# Patient Record
Sex: FEMALE | Race: WHITE | Employment: OTHER | URBAN - METROPOLITAN AREA
[De-identification: names, ages, dates, MRNs, and addresses within clinical notes are randomized per-mention and may not be internally consistent; named-entity substitution may affect disease eponyms.]

---

## 2019-04-10 ENCOUNTER — OFFICE VISIT (OUTPATIENT)
Dept: INTERNAL MEDICINE CLINIC | Age: 84
End: 2019-04-10
Payer: COMMERCIAL

## 2019-04-10 VITALS
HEIGHT: 56 IN | HEART RATE: 81 BPM | SYSTOLIC BLOOD PRESSURE: 112 MMHG | OXYGEN SATURATION: 97 % | BODY MASS INDEX: 22.36 KG/M2 | RESPIRATION RATE: 16 BRPM | DIASTOLIC BLOOD PRESSURE: 60 MMHG | WEIGHT: 99.4 LBS

## 2019-04-10 DIAGNOSIS — M41.20 SCOLIOSIS (AND KYPHOSCOLIOSIS), IDIOPATHIC: ICD-10-CM

## 2019-04-10 DIAGNOSIS — D63.8 ANEMIA, CHRONIC DISEASE: ICD-10-CM

## 2019-04-10 DIAGNOSIS — M81.0 OSTEOPOROSIS, UNSPECIFIED OSTEOPOROSIS TYPE, UNSPECIFIED PATHOLOGICAL FRACTURE PRESENCE: ICD-10-CM

## 2019-04-10 DIAGNOSIS — E53.8 VITAMIN B12 DEFICIENCY: ICD-10-CM

## 2019-04-10 DIAGNOSIS — G56.02 LEFT CARPAL TUNNEL SYNDROME: Primary | ICD-10-CM

## 2019-04-10 DIAGNOSIS — F17.200 SMOKER: ICD-10-CM

## 2019-04-10 DIAGNOSIS — R53.83 FATIGUE, UNSPECIFIED TYPE: ICD-10-CM

## 2019-04-10 PROCEDURE — 4040F PNEUMOC VAC/ADMIN/RCVD: CPT | Performed by: INTERNAL MEDICINE

## 2019-04-10 PROCEDURE — 99204 OFFICE O/P NEW MOD 45 MIN: CPT | Performed by: INTERNAL MEDICINE

## 2019-04-10 PROCEDURE — G8420 CALC BMI NORM PARAMETERS: HCPCS | Performed by: INTERNAL MEDICINE

## 2019-04-10 PROCEDURE — 1090F PRES/ABSN URINE INCON ASSESS: CPT | Performed by: INTERNAL MEDICINE

## 2019-04-10 PROCEDURE — 1123F ACP DISCUSS/DSCN MKR DOCD: CPT | Performed by: INTERNAL MEDICINE

## 2019-04-10 PROCEDURE — 4004F PT TOBACCO SCREEN RCVD TLK: CPT | Performed by: INTERNAL MEDICINE

## 2019-04-10 PROCEDURE — G8427 DOCREV CUR MEDS BY ELIG CLIN: HCPCS | Performed by: INTERNAL MEDICINE

## 2019-04-10 RX ORDER — GLIMEPIRIDE 2 MG/1
1 TABLET ORAL 2 TIMES DAILY
COMMUNITY
End: 2019-05-13 | Stop reason: ALTCHOICE

## 2019-04-10 RX ORDER — ANTIOX #8/OM3/DHA/EPA/LUT/ZEAX 250-2.5 MG
1 CAPSULE ORAL 2 TIMES DAILY
COMMUNITY

## 2019-04-10 RX ORDER — TRIAMCINOLONE ACETONIDE 1 MG/G
CREAM TOPICAL 2 TIMES DAILY
COMMUNITY

## 2019-04-10 RX ORDER — LEVOCETIRIZINE DIHYDROCHLORIDE 5 MG/1
5 TABLET, FILM COATED ORAL NIGHTLY
COMMUNITY

## 2019-04-10 RX ORDER — UREA 200 MG/G
GEL TOPICAL PRN
COMMUNITY
End: 2019-04-10 | Stop reason: ALTCHOICE

## 2019-04-10 SDOH — HEALTH STABILITY: MENTAL HEALTH: HOW OFTEN DO YOU HAVE A DRINK CONTAINING ALCOHOL?: NEVER

## 2019-04-10 ASSESSMENT — PATIENT HEALTH QUESTIONNAIRE - PHQ9
SUM OF ALL RESPONSES TO PHQ QUESTIONS 1-9: 0
SUM OF ALL RESPONSES TO PHQ9 QUESTIONS 1 & 2: 0
1. LITTLE INTEREST OR PLEASURE IN DOING THINGS: 0
SUM OF ALL RESPONSES TO PHQ QUESTIONS 1-9: 0
2. FEELING DOWN, DEPRESSED OR HOPELESS: 0

## 2019-04-10 ASSESSMENT — ENCOUNTER SYMPTOMS
COUGH: 0
ABDOMINAL PAIN: 0
BACK PAIN: 1
TROUBLE SWALLOWING: 0
NAUSEA: 0
EYE DISCHARGE: 0
VOICE CHANGE: 0
EYE ITCHING: 0
VOMITING: 0
SHORTNESS OF BREATH: 0
COLOR CHANGE: 0
BLOOD IN STOOL: 0
DIARRHEA: 0
WHEEZING: 0
CHEST TIGHTNESS: 0

## 2019-04-10 NOTE — PROGRESS NOTES
Subjective:      Cady Ribeiro is a 80 y.o. female who presents today as a new patient    Patient Active Problem List   Diagnosis    Smoker    Scoliosis (and kyphoscoliosis), idiopathic    Osteoporosis        She is former patient of Dr Jessica Giles who retired  She had been seeing him many yrs    She is ambulatory with a walker    She had fall 2 yr ago and has residual problem with left hand and arm; Had some left cerv radicular sx and now pain is limited to left hand ; assoc with tingling; improved if hand is propped up ; no testing done; no EMG  ? CTS    She uses kenalog on back from Dr Lucian Watson for eczema    She has kyphosis, scolisosis, and likely osteoporosis ;she stopped her bisphosphonate a few yrs ago and is untreated now  She didn't like fosamax    She lives alone in apt  She has lifeline    She smokes ; counseled ; not even slightly interested in quitting;  Smokes 1/3 PPD    The patient denies cough, chest pain, dyspnea, wheezing or hemoptysis. Patient denies any exertional chest pain, dyspnea, palpitations, syncope, orthopnea, edema or paroxysmal nocturnal dyspnea. Mood and memory satisfactory    Current Outpatient Medications   Medication Sig Dispense Refill    levocetirizine (XYZAL) 5 MG tablet Take 5 mg by mouth nightly      triamcinolone (KENALOG) 0.1 % cream Apply topically 2 times daily Apply topically 2 times daily.  Multiple Vitamins-Minerals (PRESERVISION AREDS 2) CAPS Take 1 tablet by mouth 2 times daily       timolol (TIMOPTIC) 0.25 % ophthalmic solution 1 drop 2 times daily      bimatoprost (LUMIGAN) 0.01 % SOLN ophthalmic drops Place 1 drop into both eyes nightly      brimonidine (ALPHAGAN P) 0.1 % SOLN 1 drop every 8 hours       No current facility-administered medications for this visit.         Past Medical History:   Diagnosis Date    Eczema     Dr Mago Irvin Glaucoma     Dr Iram Brenner Osteoporosis     Scoliosis (and kyphoscoliosis), idiopathic     Smoker       Past Surgical History:   Procedure Laterality Date    CATARACT REMOVAL WITH IMPLANT Bilateral 2004       Social History     Tobacco Use    Smoking status: Current Every Day Smoker     Packs/day: 0.30     Years: 85.00     Pack years: 25.50     Types: Cigarettes    Smokeless tobacco: Never Used   Substance Use Topics    Alcohol use: Never     Frequency: Never      Review of Systems   Constitutional: Positive for fatigue. Negative for activity change, appetite change, fever and unexpected weight change. HENT: Negative for congestion, dental problem, postnasal drip, trouble swallowing and voice change. Eyes: Negative for discharge and itching. Respiratory: Negative for cough, chest tightness, shortness of breath and wheezing. Cardiovascular: Negative for chest pain, palpitations and leg swelling. Gastrointestinal: Negative for abdominal pain, blood in stool, diarrhea, nausea and vomiting. Genitourinary: Negative for difficulty urinating, dysuria, hematuria and vaginal bleeding. Has no incontinence   Musculoskeletal: Positive for arthralgias, back pain and neck stiffness. Negative for gait problem, joint swelling and myalgias. Skin: Negative for color change, pallor and rash. Neurological: Positive for numbness. Negative for dizziness, tremors and speech difficulty. Hematological: Negative for adenopathy. Does not bruise/bleed easily. Psychiatric/Behavioral: Negative for behavioral problems, decreased concentration, dysphoric mood and suicidal ideas. The patient is not nervous/anxious. Objective:      /60 (Site: Right Upper Arm, Position: Sitting, Cuff Size: Medium Adult)   Pulse 81   Resp 16   Ht 4' 8\" (1.422 m)   Wt 99 lb 6.4 oz (45.1 kg)   SpO2 97%   BMI 22.29 kg/m²      Physical Exam   Constitutional: She is oriented to person, place, and time. She appears well-nourished. No distress.    Elderly frail lady of small stature with obvious gibbus deformity and scoliosis ambulatory slowly with walker   HENT:   Head: Normocephalic and atraumatic. Mouth/Throat: Oropharynx is clear and moist. No oropharyngeal exudate. Eyes: Conjunctivae are normal. No scleral icterus. Neck:   Limited neck ROM; head forward and canted to left from scoliosis and kypohosis   Cardiovascular: Normal rate and regular rhythm. No murmur heard. Pulmonary/Chest: Effort normal. No respiratory distress. She has no wheezes. She has no rales. Abdominal: Soft. She exhibits no distension. There is no tenderness. Musculoskeletal: Normal range of motion. She exhibits no edema or deformity. Neurological: She is alert and oriented to person, place, and time. Coordination abnormal.   Skin: Skin is warm and dry. Psychiatric: She has a normal mood and affect. Her behavior is normal.   Vitals reviewed. No old records available     Assessment / Plan:      1. Left carpal tunnel syndrome    2. Osteoporosis, unspecified osteoporosis type, unspecified pathological fracture presence    3. Scoliosis (and kyphoscoliosis), idiopathic    4. Smoker    5. Vitamin B12 deficiency    6. Anemia, chronic disease    7.  Fatigue, unspecified type            Plan   Get left wrist spling  Get EMG left hand, arm , neck  Get labs   Orders Placed This Encounter   Procedures    CBC Auto Differential    Comprehensive Metabolic Panel    TSH without Reflex    T4, Free    Vitamin B12    EMG     Same meds  RTC 2 wk after EMG    Consider DEXA and prolia ; but she isn't interested as she is 80 ; she feels little ability to change  Won't consider smoking cessation ; discussed risk of fire also

## 2019-04-18 ENCOUNTER — OFFICE VISIT (OUTPATIENT)
Dept: PHYSICAL MEDICINE AND REHAB | Age: 84
End: 2019-04-18
Payer: COMMERCIAL

## 2019-04-18 DIAGNOSIS — G56.03 BILATERAL CARPAL TUNNEL SYNDROME: ICD-10-CM

## 2019-04-18 DIAGNOSIS — M79.602 PARESTHESIA AND PAIN OF BOTH UPPER EXTREMITIES: ICD-10-CM

## 2019-04-18 DIAGNOSIS — M54.12 CERVICAL RADICULOPATHY AT C8: Primary | ICD-10-CM

## 2019-04-18 DIAGNOSIS — M79.601 PARESTHESIA AND PAIN OF BOTH UPPER EXTREMITIES: ICD-10-CM

## 2019-04-18 DIAGNOSIS — R20.2 PARESTHESIA AND PAIN OF BOTH UPPER EXTREMITIES: ICD-10-CM

## 2019-04-18 DIAGNOSIS — G56.23 ULNAR NEUROPATHY OF BOTH UPPER EXTREMITIES: ICD-10-CM

## 2019-04-18 PROCEDURE — 99999 PR OFFICE/OUTPT VISIT,PROCEDURE ONLY: CPT | Performed by: PHYSICAL MEDICINE & REHABILITATION

## 2019-04-18 PROCEDURE — 95911 NRV CNDJ TEST 9-10 STUDIES: CPT | Performed by: PHYSICAL MEDICINE & REHABILITATION

## 2019-04-18 PROCEDURE — 95885 MUSC TST DONE W/NERV TST LIM: CPT | Performed by: PHYSICAL MEDICINE & REHABILITATION

## 2019-04-18 NOTE — LETTER
April 18, 2019        Ana Olguin MD  [facility], [address]        Patient Name: Сергей Kelly   MRN Number: [id number]   YOB: 1929   Date Of Visit: 04/18/2019          Dear Ana Olguin MD,       Thank you for referring Сергей Kelly to me for electrodiagnostic testing. Attached are the findings of the EMG and my assessment. If you have any further questions, please do not hesitate to call me. Thank you for this interesting referral.      Sincerely,          CECIL Hawkins MD.

## 2019-04-18 NOTE — PROGRESS NOTES
Insertional Activity Spontaneous  Activity Volutional  MUAP's   Cerv Parasp    Normal None Normal   Infraspinatus    Normal None Normal   Deltoid      Normal None Normal   Biceps      Normal None Normal   Triceps      Normal None Dec #   Pronator Teres    Normal None Normal   Extensor Indicis    Normal None Dec #, Larger   1st Dorsal Interosseus    Normal None Dec #, Larger   ADM    Normal None Dec #. Larger   Abductor Pollicis Br. Normal None Dec #, Larger       FINDINGS:   EMG of the cervical paraspinals and left upper limb revealed paraspinal muscle guarding with needle exam of that region. No limb muscle membrane irritability was identified. Distinctly reduced number of much larger motor units were seen throughout the left C8 myotome. Median sensory latencies were mildly delayed across each wrist. Median motor conductions were normal. The left ulnar sensory latency was delayed and the SNAP amplitude was reduced. Bilaterally ulnar motor conductions were compromised around each elbow. IMPRESSION:      1. Abnormal EMG. There is evidence of a mature/remote left C8 spinal nerve root injury of significance severity (moderately severe left C8 radiculopathy, remote). I do believe this explains her clinical presentation. Correlation with cervical spine imaging is encouraged. 2. 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 3. Minor median neuropathies at the wrists (very mild bilateral carpal tunnel syndrome). 4. No evidence of a cervical plexopathy or primary muscle disease.        Thank you for this interesting referral.

## 2019-05-13 ENCOUNTER — OFFICE VISIT (OUTPATIENT)
Dept: INTERNAL MEDICINE CLINIC | Age: 84
End: 2019-05-13
Payer: COMMERCIAL

## 2019-05-13 VITALS
OXYGEN SATURATION: 96 % | SYSTOLIC BLOOD PRESSURE: 120 MMHG | WEIGHT: 100.4 LBS | DIASTOLIC BLOOD PRESSURE: 68 MMHG | HEART RATE: 76 BPM | RESPIRATION RATE: 12 BRPM | BODY MASS INDEX: 22.51 KG/M2

## 2019-05-13 DIAGNOSIS — G89.29 CHRONIC LEFT-SIDED LOW BACK PAIN WITH LEFT-SIDED SCIATICA: Primary | ICD-10-CM

## 2019-05-13 DIAGNOSIS — F17.200 SMOKER: ICD-10-CM

## 2019-05-13 DIAGNOSIS — M81.0 OSTEOPOROSIS, UNSPECIFIED OSTEOPOROSIS TYPE, UNSPECIFIED PATHOLOGICAL FRACTURE PRESENCE: ICD-10-CM

## 2019-05-13 DIAGNOSIS — M41.20 SCOLIOSIS (AND KYPHOSCOLIOSIS), IDIOPATHIC: ICD-10-CM

## 2019-05-13 DIAGNOSIS — E03.8 SUBCLINICAL HYPOTHYROIDISM: ICD-10-CM

## 2019-05-13 DIAGNOSIS — M54.42 CHRONIC LEFT-SIDED LOW BACK PAIN WITH LEFT-SIDED SCIATICA: Primary | ICD-10-CM

## 2019-05-13 PROCEDURE — 99214 OFFICE O/P EST MOD 30 MIN: CPT | Performed by: INTERNAL MEDICINE

## 2019-05-13 RX ORDER — DORZOLAMIDE HYDROCHLORIDE AND TIMOLOL MALEATE 20; 5 MG/ML; MG/ML
SOLUTION/ DROPS OPHTHALMIC
Refills: 1 | COMMUNITY
Start: 2019-04-03 | End: 2019-07-11 | Stop reason: ALTCHOICE

## 2019-05-13 RX ORDER — UREA 20 %
CREAM (GRAM) TOPICAL
Refills: 6 | COMMUNITY
Start: 2019-04-16 | End: 2019-05-13 | Stop reason: ALTCHOICE

## 2019-05-13 NOTE — PROGRESS NOTES
Subjective:      Familia Anna is a 80 y.o. female who presents today for follow up on her chronic medical conditions as noted below. Patient Active Problem List:     Smoker     Scoliosis (and kyphoscoliosis), idiopathic     Osteoporosis     She was last seen a month ago as new patient from [facility]    She complained of     She had fall 2 yr ago and has residual problem with left hand and arm; Had some left cerv radicular sx and now pain is limited to left hand ; assoc with tingling; improved if hand is propped up ; no testing done; no EMG  ? CTS     She has kyphosis, scolisosis, and likely osteoporosis ;she stopped her bisphosphonate a few yrs ago and is untreated now  She didn't like fosamax     She lives alone in apt  She has lifeline     She smokes ; counseled ; not even slightly interested in quitting;  Smokes 1/3 PPD       She had EMG for sx of left cerv rad in April     IMPRESSION:                  1. Abnormal EMG. There is evidence of a mature/remote left C8 spinal nerve root injury of significance severity (moderately severe left C8 radiculopathy, remote). I do believe this explains her clinical presentation. Correlation with cervical spine imaging is encouraged.                 2. 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                 3. Minor median neuropathies at the wrists (very mild bilateral carpal tunnel syndrome).                4. No evidence of a cervical plexopathy or primary muscle disease.     I rev'd EMG and suggested MRI and eval for epidurals / neurology eval  She declined ; doesn't bother her that much ; denies neck pain now and can't lie flat for 20 min for MRI    Has some left low back pain  Will send for PT    Has elevated TSH > 8  With normal Free T4   Subclinical hypothyroidism    However,  Has feeling of being cold and has some constipation    Patient denies any exertional chest pain, dyspnea, palpitations, syncope, orthopnea, edema or paroxysmal nocturnal dyspnea.     Mood and memory fine    Current Outpatient Medications   Medication Sig Dispense Refill    dorzolamide-timolol (COSOPT) 22.3-6.8 MG/ML ophthalmic solution INSTILL ONE DROP INTO BOTH EYES TWO TIMES A DAY  1    levocetirizine (XYZAL) 5 MG tablet Take 5 mg by mouth nightly      triamcinolone (KENALOG) 0.1 % cream Apply topically 2 times daily Apply topically 2 times daily.  Multiple Vitamins-Minerals (PRESERVISION AREDS 2) CAPS Take 1 tablet by mouth 2 times daily       bimatoprost (LUMIGAN) 0.01 % SOLN ophthalmic drops Place 1 drop into both eyes nightly      brimonidine (ALPHAGAN P) 0.1 % SOLN 1 drop every 8 hours       No current facility-administered medications for this visit. Past Medical History:   Diagnosis Date    Eczema     Dr Milo Naik Osteoporosis     Scoliosis (and kyphoscoliosis), idiopathic     Smoker         Social History     Tobacco Use    Smoking status: Current Every Day Smoker     Packs/day: 0.30     Years: 85.00     Pack years: 25.50     Types: Cigarettes    Smokeless tobacco: Never Used   Substance Use Topics    Alcohol use: Never     Frequency: Never        ROS: The patient has had no headache, sore throat, fever or chills, cough, dyspnea, chest pain, nausea, vomiting or diarrhea, or edema. Objective:      /68   Pulse 76   Resp 12   Wt 100 lb 6.4 oz (45.5 kg)   SpO2 96%   BMI 22.51 kg/m²      Physical Exam   Constitutional: She is oriented to person, place, and time. She appears well-developed and well-nourished. No distress. HENT:   Head: Normocephalic and atraumatic. Mouth/Throat: Oropharynx is clear and moist.   Eyes: Conjunctivae are normal. No scleral icterus. Neck: Neck supple. Cardiovascular: Normal rate and regular rhythm. No murmur heard. Pulmonary/Chest: Effort normal. No respiratory distress. She has no wheezes. She has no rales. Abdominal: Soft. She exhibits no distension. There is no tenderness. Musculoskeletal:   Sits with head canted to left   Neurological: She is alert and oriented to person, place, and time. Coordination abnormal.   Skin: Skin is warm and dry. Psychiatric: She has a normal mood and affect. Her behavior is normal.   Vitals reviewed. Lab rev'd     Assessment / Plan:      1. Chronic left-sided low back pain with left-sided sciatica    2.  Subclinical hypothyroidism            Plan   Refer to PT for low back pain  Declined MRI and eval for cerv epidural and neuro eval for ulnar neuro  Will repeat thyroid function  RTC 2 mo  Orders Placed This Encounter   Procedures    TSH without Reflex    T4, Free   [facility]     Counseled to stop cigs and tx for osteo  Will follow    Script written for ensure one can bid for nutritional support

## 2019-05-29 DIAGNOSIS — E03.8 SUBCLINICAL HYPOTHYROIDISM: Primary | ICD-10-CM

## 2019-05-29 DIAGNOSIS — E03.8 SUBCLINICAL HYPOTHYROIDISM: ICD-10-CM

## 2019-05-29 LAB
T4 FREE: 1.2 NG/DL (ref 0.9–1.8)
TSH SERPL DL<=0.05 MIU/L-ACNC: 5.84 UIU/ML (ref 0.27–4.2)

## 2019-06-03 ENCOUNTER — HOSPITAL ENCOUNTER (OUTPATIENT)
Dept: PHYSICAL THERAPY | Age: 84
Setting detail: THERAPIES SERIES
Discharge: HOME | End: 2019-06-03
Payer: COMMERCIAL

## 2019-06-03 PROCEDURE — 97162 PT EVAL MOD COMPLEX 30 MIN: CPT

## 2019-06-03 PROCEDURE — 97112 NEUROMUSCULAR REEDUCATION: CPT

## 2019-06-03 ASSESSMENT — PAIN DESCRIPTION - LOCATION: LOCATION: NECK

## 2019-06-03 ASSESSMENT — PAIN DESCRIPTION - PAIN TYPE: TYPE: CHRONIC PAIN

## 2019-06-03 ASSESSMENT — PAIN - FUNCTIONAL ASSESSMENT: PAIN_FUNCTIONAL_ASSESSMENT: PREVENTS OR INTERFERES SOME ACTIVE ACTIVITIES AND ADLS

## 2019-06-03 ASSESSMENT — PAIN DESCRIPTION - FREQUENCY: FREQUENCY: CONTINUOUS

## 2019-06-03 ASSESSMENT — PAIN SCALES - GENERAL: PAINLEVEL_OUTOF10: 4

## 2019-06-03 NOTE — PROGRESS NOTES
Physical Therapy  Initial Assessment  Date: 6/3/2019  Patient Name: Angela Rodriguez  MRN: [id number]  : 1929     Treatment Diagnosis: neck pain    Restrictions  Position Activity Restriction  Other position/activity restrictions: none    Subjective   General  Chart Reviewed: Yes  Patient assessed for rehabilitation services?: Yes  Additional Pertinent Hx: Abnormal EMG. There is evidence of a mature/remote left C8 spinal nerve root injury of significance severity (moderately severe left C8 radiculopathy, remote). Family / Caregiver Present: Yes  Referring Practitioner: Luis Correa  Diagnosis: L LBP with L side sciatica  Follows Commands: Within Functional Limits  PT Visit Information  PT Insurance Information: [employer] Britton covers  patient's co pay  Subjective  Subjective: ongoing neck/spinal pain since falling  about 2 aand 1/2 years ago  Pain Screening  Patient Currently in Pain: Yes  Pain Assessment  Pain Assessment: 0-10  Pain Level: 4  Pain Type: Chronic pain  Pain Location: Neck(R upper back)  Pain Frequency: Continuous  Functional Pain Assessment: Prevents or interferes some active activities and ADLs  Vital Signs  Patient Currently in Pain: Yes    Vision/Hearing  Vision  Vision: (glasses)  Hearing  Hearing: Within functional limits    Orientation  Orientation  Overall Orientation Status: Within Normal Limits    Social/Functional History  Social/Functional History  Lives With: Alone  Type of Home: Apartment  ADL Assistance: Independent  Homemaking Assistance: Needs assistance  Meal Prep: Total  Laundry: Total  Vacuuming: Total  Cleaning:  Total  Driving: Total    Objective     Observation/Palpation  Posture: Poor  Palpation: R > L upper trap TTP  Observation: - severe thoracic kyphosis; head SB L/ rot to R; scapula protracted    Spine  Cervical: R/L ROT AROM 20* limited by stiffness    Strength RUE  Comment: grossly 3/5  Strength ALYSIA  Comment: grossly 3/5                      Ambulation  Ambulation?: Yes  Ambulation 1  Device: Rolling Walker  Gait Deviations: Slow Ameena;Decreased head and trunk rotation                            Assessment   Conditions Requiring Skilled Therapeutic Intervention  Body structures, Functions, Activity limitations: Decreased ROM; Increased Pain;Decreased functional mobility   Treatment Diagnosis: neck pain  Prognosis: Good  Decision Making: Medium Complexity  History: PF- chronic  neck pain  Exam: neck ROM/posture  Clinical Presentation: changing charactersitics of functio due to pain  REQUIRES PT FOLLOW UP: Yes         Plan        G-Code       OutComes Score                                                  AM-PAC Score             Goals  Short term goals  Time Frame for Short term goals: check in 10 sessions  Short term goal 1: compliant with HEP  Short term goal 2: patient reports 10% improvement in condition since starting PT  Long term goals  Time Frame for Long term goals : 12 weeks 8/27/19  Long term goal 1: patient's goal- less neck pain/improve posture       Therapy Time   Individual Concurrent Group Co-treatment   Time In 1017         Time Out 1052         Minutes 35         Timed Code Treatment Minutes: 600 Calixto Tiwari, PT

## 2019-06-03 NOTE — PLAN OF CARE
Outpatient Physical Therapy        [x] Phone: [phone]   Fax: [phone]   Pediatric Therapy          [] Phone: [phone]   Fax: [phone]  [facility]          [] Phone: [phone]   Fax: [phone]      To: Referring Practitioner: Pillo Knapp    From: Fei Ware, WANDA     Patient: Fatmata Mars       : 1929  Diagnosis: L LBP with L side sciatica   Treatment Diagnosis: neck pain   Date: 6/3/2019    Physical Therapy Certification/Re-Certification Form    The following patient has been evaluated for physical therapy services and for therapy to continue, Please review the attached evaluation and/or summary of the patient's plan of care, and verify that you agree therapy should continue by signing the attached document and sending it back to our office.     Assessment:  Patient primary complaints:neck/upper back pain   History of condition:ongoing neck/spinal pain since falling  about 2 and 1/2 years ago  Current functional limitations: difficulty with neck movement/maniating proper neck posture  PLOF:[place] alone - has had help for some time- LOF has been stattc for @ least past year  Prominent clinical findings:neck AROM llimited R/L ROT- patient holds nead with SB to L/ROT to R  Progressive treatment plan will emphasize:AROM/ stretching/posture/ HEP  Barriers to learning:/preferred learning style:  none/ written- demonstration -practice  Potential barriers to progress:chronic pain changes  The patient appears/reports motivated to regain PLOF: yes   Planned Services:  [x] Therapeutic Exercise    [] Aquatics:  [x] Therapeutic Activity    [] Ultrasound  [] Elec Stimulation  [] Gait Training     [] Cervical Traction [] Lumbar Traction  [x] Neuromuscular Re-education [] Cold/hotpack [] Iontophoresis   [x] Instruction in HEP       [x] Manual Therapy     [] vasopneumatic            [x] Self care home management        []Dry needling trigger point point/pain management    Plan of Care Date Range: Till 8/27/19    ? Frequency/Duration:  # Days per week: [] 1 day # Weeks: [] 1 week [] 5 weeks     [x] 2 days? [] 2 weeks [] 6 weeks     [] 3 days   [] 3 weeks [] 7 weeks     [] 4 days   [] 4 weeks [x] 12 weeks  Rehab Potential: [] Excellent [x] Good [] Fair  [] Poor   Goals:  Short term goals  Time Frame for Short term goals: check in 10 sessions  Short term goal 1: compliant with HEP  Short term goal 2: patient reports 10% improvement in condition since starting PT  Long term goals  Time Frame for Long term goals : 12 weeks 8/27/19  Long term goal 1: patient's goal- less neck pain/improve posture  Electronically signed by:  Prakash Almeida PT, 6/3/2019, 5:34 PM        If you have any questions or concerns, please don't hesitate to call.   Thank you for your referral.    Physician Signature:_________________Date:____________Time: ________  By signing above, therapists plan is approved by physician

## 2019-06-10 ENCOUNTER — HOSPITAL ENCOUNTER (OUTPATIENT)
Dept: PHYSICAL THERAPY | Age: 84
Setting detail: THERAPIES SERIES
Discharge: HOME | End: 2019-06-10
Payer: COMMERCIAL

## 2019-06-10 PROCEDURE — 97110 THERAPEUTIC EXERCISES: CPT

## 2019-06-10 PROCEDURE — 97140 MANUAL THERAPY 1/> REGIONS: CPT

## 2019-06-10 NOTE — FLOWSHEET NOTE
Outpatient Physical Therapy  [facility]           [x] Phone: [phone]   Fax: [phone]  [facility]           [] Phone: [phone]   Fax: [phone]        Physical Therapy Daily Treatment Note  Date:  6/10/2019    Patient Name:  Betty Ireland    :  1929  MRN: [id number]  Restrictions/Precautions:  Other position/activity restrictions: none  Diagnosis:   Diagnosis: L LBP with L side sciatica  Date of Injury/Surgery: n/a  Treatment Diagnosis: Treatment Diagnosis: neck pain    Insurance/Certification information: PT Insurance Information: Brian Link covers  patient's co pay   Referring Physician:  Referring Practitioner: Kisha Isaacs  Next Doctor Visit:    Plan of care signed (Y/N):    Outcome Measure:   Visit# / total visits:  2/10 reassess  Pain level: 0/10  R neck/upper back   Goals:       Short term goals  Time Frame for Short term goals: check in 10 sessions  Short term goal 1: compliant with HEP  Short term goal 2: patient reports 10% improvement in condition since starting PT  Long term goals  Time Frame for Long term goals : 12 weeks 19  Long term goal 1: patient's goal- less neck pain/improve posture    Summary of Evaluation:    ASSESSMENT  Patient primary complaints:neck/upper back pain   History of condition:ongoing neck/spinal pain since falling  about 2 and 1/2 years ago  Current functional limitations: difficulty with neck movement/maniating proper neck posture  PLOF:[place] alone - has had help for some time- LOF has been stattc for @ least past year  Prominent clinical findings:neck AROM llimited R/L ROT- patient holds nead with SB to L/ROT to R  Progressive treatment plan will emphasize:AROM/ stretching/posture/ HEP  Barriers to learning:/preferred learning style:  none/ written- demonstration -practice  Potential barriers to progress:chronic pain changes  The patient appears/reports motivated to regain PLOF: yes      Subjective:   Patient denies pain upon arrival just notes discomfort in her neck from trying to hold it up. Any changes in Ambulatory Summary Sheet?   None        Objective:  New exs added          Exercises: (No more than 4 columns)   Exercise/Equipment Date 6/3/19 Date 6/10/19  #2 Date           WARM UP      Nustep     Lv2 seat 9/arms 11   10'          TABLE            Seated scap sets x10 reps 2x10    Hip flexor stretch  Off the side of the table 2x30\" B    HS stretch  Manual 2x30\" B    Pec minor stretch      HC stretch  Manual 2x30\" B    Supine neck stretch  Patient lying w/2 pillows under head stretching her neck into ext 1-2'                         STANDING      Standing shld flex  At wall 10x Seated    Standing Horiz ABD  Seated 10x                                       PROPRIOCEPTION                                    MODALITIES                      Other Therapeutic Activities/Education:        Home Exercise Program:  6/3/19: seated scap sets      Manual Treatments:  During neck ext stretch and after pt received STM to the R neck/UT area 8'      Modalities:        Communication with other providers:        Assessment:  (Response towards treatment session) (Pain Rating)  0/10  Tired/soreness across her low back         Plan for Next Session:  hip flexor/hamstring stretching/ pec minor stretching      Time In / Time Out:   1019/1113        Timed Code/Total Treatment Minutes:  47  12man  42te    Next Progress Note due:        Plan of Care Interventions:  [x] Therapeutic Exercise  [] Modalities:  [x] Therapeutic Activity     [] Ultrasound  [] Estim  [] Gait Training      [] Cervical Traction [] Lumbar Traction  [x] Neuromuscular Re-education    [x] Cold/hotpack [] Iontophoresis   [x] Instruction in HEP      [] Vasopneumatic   [] Dry Needling    [x] Manual Therapy               [] Aquatic Therapy              Electronically signed by:  Reyna Tovar 6/10/2019, 10:19 AM

## 2019-06-19 ENCOUNTER — HOSPITAL ENCOUNTER (OUTPATIENT)
Dept: PHYSICAL THERAPY | Age: 84
Setting detail: THERAPIES SERIES
Discharge: HOME | End: 2019-06-19
Payer: COMMERCIAL

## 2019-06-19 PROCEDURE — 97124 MASSAGE THERAPY: CPT

## 2019-06-19 PROCEDURE — 97110 THERAPEUTIC EXERCISES: CPT

## 2019-06-24 ENCOUNTER — HOSPITAL ENCOUNTER (OUTPATIENT)
Dept: PHYSICAL THERAPY | Age: 84
Discharge: HOME | End: 2019-06-24

## 2019-07-11 ENCOUNTER — OFFICE VISIT (OUTPATIENT)
Dept: INTERNAL MEDICINE CLINIC | Age: 84
End: 2019-07-11
Payer: COMMERCIAL

## 2019-07-11 VITALS
WEIGHT: 99 LBS | BODY MASS INDEX: 22.2 KG/M2 | OXYGEN SATURATION: 98 % | SYSTOLIC BLOOD PRESSURE: 115 MMHG | DIASTOLIC BLOOD PRESSURE: 68 MMHG | HEART RATE: 79 BPM

## 2019-07-11 DIAGNOSIS — M40.04 POSTURAL KYPHOSIS OF THORACIC REGION: ICD-10-CM

## 2019-07-11 DIAGNOSIS — F17.200 SMOKER: ICD-10-CM

## 2019-07-11 DIAGNOSIS — E03.8 SUBCLINICAL HYPOTHYROIDISM: Primary | ICD-10-CM

## 2019-07-11 PROCEDURE — 99214 OFFICE O/P EST MOD 30 MIN: CPT | Performed by: INTERNAL MEDICINE

## 2019-07-11 RX ORDER — DORZOLAMIDE HYDROCHLORIDE AND TIMOLOL MALEATE 20; 5 MG/ML; MG/ML
1 SOLUTION/ DROPS OPHTHALMIC 2 TIMES DAILY
COMMUNITY

## 2019-07-11 RX ORDER — LATANOPROST 50 UG/ML
1 SOLUTION/ DROPS OPHTHALMIC DAILY
COMMUNITY

## 2019-08-29 DIAGNOSIS — E03.8 SUBCLINICAL HYPOTHYROIDISM: ICD-10-CM

## 2019-08-29 LAB
A/G RATIO: 0.9 (ref 1.1–2.2)
ALBUMIN SERPL-MCNC: 3.7 G/DL (ref 3.4–5)
ALP BLD-CCNC: 330 U/L (ref 40–129)
ALT SERPL-CCNC: 55 U/L (ref 10–40)
ANION GAP SERPL CALCULATED.3IONS-SCNC: 15 MMOL/L (ref 3–16)
AST SERPL-CCNC: 61 U/L (ref 15–37)
BASOPHILS ABSOLUTE: 0.1 K/UL (ref 0–0.2)
BASOPHILS RELATIVE PERCENT: 1 %
BILIRUB SERPL-MCNC: 0.4 MG/DL (ref 0–1)
BUN BLDV-MCNC: 13 MG/DL (ref 7–20)
CALCIUM SERPL-MCNC: 9.4 MG/DL (ref 8.3–10.6)
CHLORIDE BLD-SCNC: 99 MMOL/L (ref 99–110)
CO2: 23 MMOL/L (ref 21–32)
CREAT SERPL-MCNC: 0.8 MG/DL (ref 0.6–1.2)
EOSINOPHILS ABSOLUTE: 0.2 K/UL (ref 0–0.6)
EOSINOPHILS RELATIVE PERCENT: 4.3 %
GFR AFRICAN AMERICAN: >60
GFR NON-AFRICAN AMERICAN: >60
GLOBULIN: 3.9 G/DL
GLUCOSE BLD-MCNC: 89 MG/DL (ref 70–99)
HCT VFR BLD CALC: 41.7 % (ref 36–48)
HEMOGLOBIN: 14 G/DL (ref 12–16)
LYMPHOCYTES ABSOLUTE: 1.7 K/UL (ref 1–5.1)
LYMPHOCYTES RELATIVE PERCENT: 33.2 %
MCH RBC QN AUTO: 33.2 PG (ref 26–34)
MCHC RBC AUTO-ENTMCNC: 33.5 G/DL (ref 31–36)
MCV RBC AUTO: 99 FL (ref 80–100)
MONOCYTES ABSOLUTE: 0.5 K/UL (ref 0–1.3)
MONOCYTES RELATIVE PERCENT: 9.7 %
NEUTROPHILS ABSOLUTE: 2.6 K/UL (ref 1.7–7.7)
NEUTROPHILS RELATIVE PERCENT: 51.8 %
PDW BLD-RTO: 13.5 % (ref 12.4–15.4)
PLATELET # BLD: 287 K/UL (ref 135–450)
PMV BLD AUTO: 8.2 FL (ref 5–10.5)
POTASSIUM SERPL-SCNC: 4.4 MMOL/L (ref 3.5–5.1)
RBC # BLD: 4.21 M/UL (ref 4–5.2)
SODIUM BLD-SCNC: 137 MMOL/L (ref 136–145)
T4 FREE: 1.4 NG/DL (ref 0.9–1.8)
TOTAL PROTEIN: 7.6 G/DL (ref 6.4–8.2)
TSH SERPL DL<=0.05 MIU/L-ACNC: 8.67 UIU/ML (ref 0.27–4.2)
WBC # BLD: 5 K/UL (ref 4–11)

## 2019-11-19 ENCOUNTER — OFFICE VISIT (OUTPATIENT)
Dept: INTERNAL MEDICINE CLINIC | Age: 84
End: 2019-11-19
Payer: COMMERCIAL

## 2019-11-19 VITALS
SYSTOLIC BLOOD PRESSURE: 102 MMHG | BODY MASS INDEX: 22.58 KG/M2 | HEART RATE: 83 BPM | WEIGHT: 100.4 LBS | DIASTOLIC BLOOD PRESSURE: 74 MMHG | OXYGEN SATURATION: 90 % | HEIGHT: 56 IN

## 2019-11-19 DIAGNOSIS — R20.0 NUMBNESS OF FINGERS: ICD-10-CM

## 2019-11-19 DIAGNOSIS — E03.8 SUBCLINICAL HYPOTHYROIDISM: Primary | ICD-10-CM

## 2019-11-19 DIAGNOSIS — R61 DIAPHORESIS: ICD-10-CM

## 2019-11-19 DIAGNOSIS — E03.8 SUBCLINICAL HYPOTHYROIDISM: ICD-10-CM

## 2019-11-19 DIAGNOSIS — M54.12 LEFT CERVICAL RADICULOPATHY: ICD-10-CM

## 2019-11-19 LAB
A/G RATIO: 1 (ref 1.1–2.2)
ALBUMIN SERPL-MCNC: 3.7 G/DL (ref 3.4–5)
ALP BLD-CCNC: 129 U/L (ref 40–129)
ALT SERPL-CCNC: 16 U/L (ref 10–40)
ANION GAP SERPL CALCULATED.3IONS-SCNC: 13 MMOL/L (ref 3–16)
AST SERPL-CCNC: 28 U/L (ref 15–37)
BASOPHILS ABSOLUTE: 0 K/UL (ref 0–0.2)
BASOPHILS RELATIVE PERCENT: 0.9 %
BILIRUB SERPL-MCNC: 0.4 MG/DL (ref 0–1)
BUN BLDV-MCNC: 18 MG/DL (ref 7–20)
CALCIUM SERPL-MCNC: 9.5 MG/DL (ref 8.3–10.6)
CHLORIDE BLD-SCNC: 101 MMOL/L (ref 99–110)
CO2: 24 MMOL/L (ref 21–32)
CREAT SERPL-MCNC: 0.8 MG/DL (ref 0.6–1.2)
EOSINOPHILS ABSOLUTE: 0.1 K/UL (ref 0–0.6)
EOSINOPHILS RELATIVE PERCENT: 1.4 %
GFR AFRICAN AMERICAN: >60
GFR NON-AFRICAN AMERICAN: >60
GLOBULIN: 3.8 G/DL
GLUCOSE BLD-MCNC: 77 MG/DL (ref 70–99)
HCT VFR BLD CALC: 43.3 % (ref 36–48)
HEMOGLOBIN: 14.7 G/DL (ref 12–16)
LYMPHOCYTES ABSOLUTE: 1.4 K/UL (ref 1–5.1)
LYMPHOCYTES RELATIVE PERCENT: 24.6 %
MCH RBC QN AUTO: 33.1 PG (ref 26–34)
MCHC RBC AUTO-ENTMCNC: 34 G/DL (ref 31–36)
MCV RBC AUTO: 97.4 FL (ref 80–100)
MONOCYTES ABSOLUTE: 0.6 K/UL (ref 0–1.3)
MONOCYTES RELATIVE PERCENT: 11.6 %
NEUTROPHILS ABSOLUTE: 3.4 K/UL (ref 1.7–7.7)
NEUTROPHILS RELATIVE PERCENT: 61.5 %
PDW BLD-RTO: 13.2 % (ref 12.4–15.4)
PLATELET # BLD: 245 K/UL (ref 135–450)
PMV BLD AUTO: 8.1 FL (ref 5–10.5)
POTASSIUM SERPL-SCNC: 3.8 MMOL/L (ref 3.5–5.1)
RBC # BLD: 4.45 M/UL (ref 4–5.2)
SODIUM BLD-SCNC: 138 MMOL/L (ref 136–145)
T4 FREE: 1.4 NG/DL (ref 0.9–1.8)
TOTAL PROTEIN: 7.5 G/DL (ref 6.4–8.2)
TSH SERPL DL<=0.05 MIU/L-ACNC: 6.21 UIU/ML (ref 0.27–4.2)
WBC # BLD: 5.6 K/UL (ref 4–11)

## 2019-11-19 PROCEDURE — 90670 PCV13 VACCINE IM: CPT | Performed by: INTERNAL MEDICINE

## 2019-11-19 PROCEDURE — 99214 OFFICE O/P EST MOD 30 MIN: CPT | Performed by: INTERNAL MEDICINE

## 2019-11-19 PROCEDURE — G0009 ADMIN PNEUMOCOCCAL VACCINE: HCPCS | Performed by: INTERNAL MEDICINE
